# Patient Record
Sex: FEMALE | Race: OTHER | NOT HISPANIC OR LATINO | URBAN - METROPOLITAN AREA
[De-identification: names, ages, dates, MRNs, and addresses within clinical notes are randomized per-mention and may not be internally consistent; named-entity substitution may affect disease eponyms.]

---

## 2022-09-15 ENCOUNTER — OFFICE VISIT (OUTPATIENT)
Dept: URGENT CARE | Facility: CLINIC | Age: 67
End: 2022-09-15

## 2022-09-15 VITALS
RESPIRATION RATE: 18 BRPM | WEIGHT: 140 LBS | HEIGHT: 66 IN | BODY MASS INDEX: 22.5 KG/M2 | DIASTOLIC BLOOD PRESSURE: 62 MMHG | OXYGEN SATURATION: 98 % | TEMPERATURE: 98 F | HEART RATE: 78 BPM | SYSTOLIC BLOOD PRESSURE: 128 MMHG

## 2022-09-15 DIAGNOSIS — S42.201A CLOSED FRACTURE OF PROXIMAL END OF RIGHT HUMERUS, UNSPECIFIED FRACTURE MORPHOLOGY, INITIAL ENCOUNTER: ICD-10-CM
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
## 2022-09-15 PROCEDURE — 73060 X-RAY EXAM OF HUMERUS: CPT | Mod: TIER,RT,S$GLB, | Performed by: RADIOLOGY

## 2022-09-15 PROCEDURE — 99204 PR OFFICE/OUTPT VISIT, NEW, LEVL IV, 45-59 MIN: ICD-10-PCS | Mod: TIER,S$GLB,, | Performed by: EMERGENCY MEDICINE

## 2022-09-15 PROCEDURE — 73060 XR HUMERUS 2 VIEW RIGHT: ICD-10-PCS | Mod: TIER,RT,S$GLB, | Performed by: RADIOLOGY

## 2022-09-15 PROCEDURE — 99204 OFFICE O/P NEW MOD 45 MIN: CPT | Mod: TIER,S$GLB,, | Performed by: EMERGENCY MEDICINE

## 2022-09-15 RX ORDER — HYDROCODONE BITARTRATE AND ACETAMINOPHEN 5; 325 MG/1; MG/1
1 TABLET ORAL EVERY 6 HOURS PRN
Qty: 23 TABLET | Refills: 0 | Status: SHIPPED | OUTPATIENT
Start: 2022-09-15

## 2022-09-16 NOTE — PROGRESS NOTES
"Subjective:       Patient ID: Nicole Kovacs is a 67 y.o. female.    Vitals:  height is 5' 6" (1.676 m) and weight is 63.5 kg (140 lb). Her temperature is 98 °F (36.7 °C). Her blood pressure is 128/62 and her pulse is 78. Her respiration is 18 and oxygen saturation is 98%.     Chief Complaint: Arm Injury    Pt states she tripped this afternoon 2pm.     Arm Injury   The incident occurred 3 to 6 hours ago. The incident occurred in the street. The injury mechanism was a direct blow. The pain is present in the right forearm. The quality of the pain is described as stabbing (throbbing). The pain does not radiate. The pain is at a severity of 10/10. The pain is severe. The pain has been Constant since the incident. Associated symptoms include numbness and tingling. Pertinent negatives include no chest pain or muscle weakness. Associated symptoms comments: Pt states in her fingers  . She has tried acetaminophen for the symptoms. The treatment provided mild relief.     Constitution: Negative for chills and fever.   HENT:  Negative for congestion.    Cardiovascular:  Negative for chest pain.   Respiratory:  Negative for cough and sputum production.    Gastrointestinal:  Negative for abdominal pain, nausea, vomiting, constipation and diarrhea.   Genitourinary:  Negative for dysuria, frequency and urgency.   Musculoskeletal:  Positive for pain, trauma, joint pain, joint swelling and abnormal ROM of joint. Negative for muscle cramps and muscle ache.   Skin:  Negative for rash and erythema.   Neurological:  Positive for numbness.     Objective:      Physical Exam   Constitutional: She is oriented to person, place, and time. She appears well-developed.   HENT:   Head: Normocephalic and atraumatic. Head is without abrasion, without contusion and without laceration.   Ears:   Right Ear: External ear normal.   Left Ear: External ear normal.   Oropharyngeal exam not performed due to risk of viral transmission during global pandemic-- " risks outweigh benefits of exam          Comments: Oropharyngeal exam not performed due to risk of viral transmission during global pandemic-- risks outweigh benefits of exam      Eyes: Conjunctivae, EOM and lids are normal. Pupils are equal, round, and reactive to light.   Neck: Trachea normal and phonation normal. Neck supple.   Cardiovascular: Normal rate, regular rhythm and normal heart sounds.   Pulmonary/Chest: Effort normal and breath sounds normal. No stridor. No respiratory distress.   Musculoskeletal:      Right shoulder: Normal.      Left shoulder: Normal.      Right elbow: Normal.She exhibits normal range of motion, no swelling, no effusion, no deformity and no laceration. No tenderness found.      Right upper arm: She exhibits tenderness and bony tenderness. She exhibits no swelling, no edema, no deformity and no laceration.      Right forearm: Normal.      Left forearm: Normal.        Arms:       Right hand: Normal.      Left hand: Normal.      Comments: Neuro-vascularly intact distal to extremity  Sensation and motor function completely intact  Less than 2 sec capillary refill present  Palpable 2+ pulse in the radial     Neurological: She is alert and oriented to person, place, and time.   Skin: Skin is warm, dry, intact and no rash. Capillary refill takes less than 2 seconds. No abrasion, No burn, No bruising, No erythema and No ecchymosis   Psychiatric: Her speech is normal and behavior is normal. Judgment and thought content normal.   Nursing note and vitals reviewed.      Assessment:       1. Fall on same level, initial encounter    2. Closed fracture of proximal end of right humerus, unspecified fracture morphology, initial encounter          Plan:         Fall on same level, initial encounter  -     Cancel: XR SHOULDER TRAUMA 3 VIEW RIGHT; Future; Expected date: 09/15/2022  -     X-Ray Humerus 2 View Right; Future; Expected date: 09/15/2022    Closed fracture of proximal end of right humerus,  unspecified fracture morphology, initial encounter    Other orders  -     HYDROcodone-acetaminophen (NORCO) 5-325 mg per tablet; Take 1 tablet by mouth every 6 (six) hours as needed for Pain.  Dispense: 23 tablet; Refill: 0         Patient was placed in a cuff and collar by Dr. Crespo neurovascular status remained intact following procedure              X-Ray Humerus 2 View Right    Result Date: 9/15/2022  EXAMINATION: XR HUMERUS 2 VIEW RIGHT CLINICAL HISTORY: Fall on same level, unspecified, initial encounter COMPARISON: None FINDINGS: Two views right humerus. There is an acute impacted fracture involving the surgical neck of the proximal right humerus.  Fracture planes extend to involve the superolateral aspect of the right humeral head.  No glenohumeral dislocation.  The distal humerus is intact.  The visualized portions of the elbow are intact.     1. Fracture of the proximal right humerus as above. Electronically signed by: Madi Koch MD Date:    09/15/2022 Time:    20:11           Patient Instructions   Patient Education     Follow up with   Orthopedist    in 5-7 days     Tylenol 500mg 2 tabs by mouth every 8 hours  Max = 8 tabs per day        Upper Arm Fracture   About this topic   A broken bone in your upper arm is known as a humerus fracture. You will need to limit the movement of your arm to help the bone heal in the correct position. You may need surgery if you have a severe arm fracture.     What care is needed at home?   Ask your doctor what you need to do when you go home. Make sure you ask questions if you do not understand what the doctor says.  Wear your brace or splint as you were told, to support your arm. You may need to have surgery or a cast after the swelling goes down.  Wear your sling to help you support your arm in the brace or cast.  Prop your arm on pillows, keeping it above the level of your heart. This may help with pain and swelling.  You may want to take medicines like ibuprofen  or naproxen for swelling and pain. These are nonsteroidal anti-inflammatory drugs (NSAIDs). You might also have gotten a prescription for stronger pain medicines to take for a short time. If so, be sure to follow the instructions for taking them.  Place an ice pack or a bag of frozen vegetables wrapped in a towel over the painful part. Never put ice right on the skin. Do not leave the ice on more than 10 to 15 minutes at a time. Using ice for the first 24 to 48 hours after an injury helps with pain and swelling.  Gently move your wrists and fingers to keep them from getting stiff.  If you smoke, try to quit. Broken bones take longer to heal if you smoke.  What follow-up care is needed?   Your doctor may ask you to make visits to the office to check on your progress. You may need more x-rays. The doctor may need to remove your cast, splint, brace or stitches. Be sure to keep these visits. Your doctor may order physical therapy or an exercise program for you.  What drugs may be needed?   The doctor may order drugs to:  Help with pain and swelling  Treat infection  Will physical activity be limited?   A fracture takes about 6 to 10 weeks to heal. Based on the type of fracture, it may take up to a year for full recovery. You may have to limit your activity. Talk to your doctor about the right amount of activity for you.  Ask your doctor when it is safe for you to:  Play sports  Drive  Work  Exercise  What problems could happen?   Infection  Damage to nerves and blood vessels  Less movement of your elbow and shoulder  Fracture does not mend  Avascular necrosis of the head of the humerus if the blood supply to this part of the bone was damaged with the fracture.  Rotator cuff injury  Arthritis  Chronic pain  What can be done to prevent this health problem?   Take extra care to avoid falling. Falls are a common cause of fractures.  Use protective equipment when playing sports.  Eat foods rich in calcium and vitamin D. This  will help keep your bones strong.  When do I need to call the doctor?   You become short of breath or have trouble breathing.  The arm with the cast or splint becomes swollen or starts to hurt more.  Your cast or splint becomes too tight and uncomfortable.  Your fingers turn cold, blue, grey, or become numb.  There is a bad smell or drainage coming from your cast or splint.  Your cast or splint feels too loose.  You notice a crack in your cast or splint, or the cast becomes soft.  The skin becomes red and irritated around the cast or splint.  Where can I learn more?   [employer]  http://orthoinfo.aaos.org/topic.cfm?gyqmg=L64727   [facility] Choices  https://www.nhs.uk/conditions/broken-arm-or-wrist/   Last Reviewed Date   2021-06-22  Consumer Information Use and Disclaimer   This information is not specific medical advice and does not replace information you receive from your health care provider. This is only a brief summary of general information. It does NOT include all information about conditions, illnesses, injuries, tests, procedures, treatments, therapies, discharge instructions or life-style choices that may apply to you. You must talk with your health care provider for complete information about your health and treatment options. This information should not be used to decide whether or not to accept your health care providers advice, instructions or recommendations. Only your health care provider has the knowledge and training to provide advice that is right for you.  Copyright   Copyright © 2021 [employer] and its affiliates and/or licensors. All rights reserved.

## 2022-09-16 NOTE — PATIENT INSTRUCTIONS
Patient Education     Follow up with   Orthopedist    in 5-7 days     Tylenol 500mg 2 tabs by mouth every 8 hours  Max = 8 tabs per day        Upper Arm Fracture   About this topic   A broken bone in your upper arm is known as a humerus fracture. You will need to limit the movement of your arm to help the bone heal in the correct position. You may need surgery if you have a severe arm fracture.     What care is needed at home?   Ask your doctor what you need to do when you go home. Make sure you ask questions if you do not understand what the doctor says.  Wear your brace or splint as you were told, to support your arm. You may need to have surgery or a cast after the swelling goes down.  Wear your sling to help you support your arm in the brace or cast.  Prop your arm on pillows, keeping it above the level of your heart. This may help with pain and swelling.  You may want to take medicines like ibuprofen or naproxen for swelling and pain. These are nonsteroidal anti-inflammatory drugs (NSAIDs). You might also have gotten a prescription for stronger pain medicines to take for a short time. If so, be sure to follow the instructions for taking them.  Place an ice pack or a bag of frozen vegetables wrapped in a towel over the painful part. Never put ice right on the skin. Do not leave the ice on more than 10 to 15 minutes at a time. Using ice for the first 24 to 48 hours after an injury helps with pain and swelling.  Gently move your wrists and fingers to keep them from getting stiff.  If you smoke, try to quit. Broken bones take longer to heal if you smoke.  What follow-up care is needed?   Your doctor may ask you to make visits to the office to check on your progress. You may need more x-rays. The doctor may need to remove your cast, splint, brace or stitches. Be sure to keep these visits. Your doctor may order physical therapy or an exercise program for you.  What drugs may be needed?   The doctor may order drugs  to:  Help with pain and swelling  Treat infection  Will physical activity be limited?   A fracture takes about 6 to 10 weeks to heal. Based on the type of fracture, it may take up to a year for full recovery. You may have to limit your activity. Talk to your doctor about the right amount of activity for you.  Ask your doctor when it is safe for you to:  Play sports  Drive  Work  Exercise  What problems could happen?   Infection  Damage to nerves and blood vessels  Less movement of your elbow and shoulder  Fracture does not mend  Avascular necrosis of the head of the humerus if the blood supply to this part of the bone was damaged with the fracture.  Rotator cuff injury  Arthritis  Chronic pain  What can be done to prevent this health problem?   Take extra care to avoid falling. Falls are a common cause of fractures.  Use protective equipment when playing sports.  Eat foods rich in calcium and vitamin D. This will help keep your bones strong.  When do I need to call the doctor?   You become short of breath or have trouble breathing.  The arm with the cast or splint becomes swollen or starts to hurt more.  Your cast or splint becomes too tight and uncomfortable.  Your fingers turn cold, blue, grey, or become numb.  There is a bad smell or drainage coming from your cast or splint.  Your cast or splint feels too loose.  You notice a crack in your cast or splint, or the cast becomes soft.  The skin becomes red and irritated around the cast or splint.  Where can I learn more?   [employer]  http://orthoinfo.aaos.org/topic.cfm?gnymo=A69251   [facility] Choices  https://www.nhs.uk/conditions/broken-arm-or-wrist/   Last Reviewed Date   2021-06-22  Consumer Information Use and Disclaimer   This information is not specific medical advice and does not replace information you receive from your health care provider. This is only a brief summary of general information. It does NOT include all information  about conditions, illnesses, injuries, tests, procedures, treatments, therapies, discharge instructions or life-style choices that may apply to you. You must talk with your health care provider for complete information about your health and treatment options. This information should not be used to decide whether or not to accept your health care providers advice, instructions or recommendations. Only your health care provider has the knowledge and training to provide advice that is right for you.  Copyright   Copyright © 2021 [employer] and its affiliates and/or licensors. All rights reserved.